# Patient Record
Sex: MALE | URBAN - NONMETROPOLITAN AREA
[De-identification: names, ages, dates, MRNs, and addresses within clinical notes are randomized per-mention and may not be internally consistent; named-entity substitution may affect disease eponyms.]

---

## 2022-09-26 ENCOUNTER — APPOINTMENT (OUTPATIENT)
Dept: URBAN - NONMETROPOLITAN AREA CLINIC 59 | Age: 67
Setting detail: DERMATOLOGY
End: 2022-09-27

## 2022-09-26 DIAGNOSIS — L82.1 OTHER SEBORRHEIC KERATOSIS: ICD-10-CM

## 2022-09-26 DIAGNOSIS — D22 MELANOCYTIC NEVI: ICD-10-CM

## 2022-09-26 DIAGNOSIS — Z85.820 PERSONAL HISTORY OF MALIGNANT MELANOMA OF SKIN: ICD-10-CM

## 2022-09-26 DIAGNOSIS — D18.0 HEMANGIOMA: ICD-10-CM

## 2022-09-26 DIAGNOSIS — L81.4 OTHER MELANIN HYPERPIGMENTATION: ICD-10-CM

## 2022-09-26 PROBLEM — D18.01 HEMANGIOMA OF SKIN AND SUBCUTANEOUS TISSUE: Status: ACTIVE | Noted: 2022-09-26

## 2022-09-26 PROBLEM — D22.5 MELANOCYTIC NEVI OF TRUNK: Status: ACTIVE | Noted: 2022-09-26

## 2022-09-26 PROCEDURE — OTHER MIPS QUALITY: OTHER

## 2022-09-26 PROCEDURE — OTHER COUNSELING: OTHER

## 2022-09-26 PROCEDURE — 99213 OFFICE O/P EST LOW 20 MIN: CPT

## 2022-09-26 PROCEDURE — OTHER ADDITIONAL NOTES: OTHER

## 2022-09-26 ASSESSMENT — LOCATION DETAILED DESCRIPTION DERM
LOCATION DETAILED: INFERIOR THORACIC SPINE
LOCATION DETAILED: MIDDLE STERNUM
LOCATION DETAILED: XIPHOID
LOCATION DETAILED: EPIGASTRIC SKIN
LOCATION DETAILED: LEFT MEDIAL UPPER BACK

## 2022-09-26 ASSESSMENT — LOCATION SIMPLE DESCRIPTION DERM
LOCATION SIMPLE: CHEST
LOCATION SIMPLE: UPPER BACK
LOCATION SIMPLE: ABDOMEN
LOCATION SIMPLE: LEFT UPPER BACK

## 2022-09-26 ASSESSMENT — LOCATION ZONE DERM: LOCATION ZONE: TRUNK

## 2022-09-26 NOTE — HPI: MELANOMA F/U (HISTORY OF MALIGNANT MELANOMA)
What Is The Reason For Today's Visit?: Follow Up Melanoma
Year Excised?: 2018
Additional History: Patient reports that he did have significant weight loss back in December 2021. He lost 50lbs after being sick for 3 weeks. Patient states that prior to losing the weight he was 200lbs and since then has gained 15lbs back.

## 2022-09-26 NOTE — PROCEDURE: ADDITIONAL NOTES
Additional Notes: Discussed with Patient the importance of seeing his PCP as Melanoma can show up anywhere in the body. Recommended that Patient see his PCP for a routine check up.
Detail Level: Simple
Render Risk Assessment In Note?: no

## 2023-09-28 ENCOUNTER — APPOINTMENT (OUTPATIENT)
Dept: URBAN - NONMETROPOLITAN AREA CLINIC 59 | Age: 68
Setting detail: DERMATOLOGY
End: 2023-09-28

## 2023-09-28 DIAGNOSIS — L21.8 OTHER SEBORRHEIC DERMATITIS: ICD-10-CM

## 2023-09-28 DIAGNOSIS — D18.0 HEMANGIOMA: ICD-10-CM

## 2023-09-28 DIAGNOSIS — Z85.820 PERSONAL HISTORY OF MALIGNANT MELANOMA OF SKIN: ICD-10-CM

## 2023-09-28 DIAGNOSIS — Z71.89 OTHER SPECIFIED COUNSELING: ICD-10-CM

## 2023-09-28 DIAGNOSIS — L82.1 OTHER SEBORRHEIC KERATOSIS: ICD-10-CM

## 2023-09-28 DIAGNOSIS — D22 MELANOCYTIC NEVI: ICD-10-CM

## 2023-09-28 DIAGNOSIS — L81.4 OTHER MELANIN HYPERPIGMENTATION: ICD-10-CM

## 2023-09-28 PROBLEM — D22.5 MELANOCYTIC NEVI OF TRUNK: Status: ACTIVE | Noted: 2023-09-28

## 2023-09-28 PROBLEM — D18.01 HEMANGIOMA OF SKIN AND SUBCUTANEOUS TISSUE: Status: ACTIVE | Noted: 2023-09-28

## 2023-09-28 PROCEDURE — OTHER DIAGNOSIS COMMENT: OTHER

## 2023-09-28 PROCEDURE — 99213 OFFICE O/P EST LOW 20 MIN: CPT

## 2023-09-28 PROCEDURE — OTHER COUNSELING: OTHER

## 2023-09-28 PROCEDURE — OTHER MIPS QUALITY: OTHER

## 2023-09-28 PROCEDURE — OTHER SUNSCREEN RECOMMENDATIONS: OTHER

## 2023-09-28 ASSESSMENT — LOCATION DETAILED DESCRIPTION DERM
LOCATION DETAILED: LEFT LATERAL INFERIOR CHEST
LOCATION DETAILED: UPPER STERNUM
LOCATION DETAILED: LEFT MEDIAL SUPERIOR CHEST
LOCATION DETAILED: LEFT SUPERIOR UPPER BACK
LOCATION DETAILED: SUPERIOR THORACIC SPINE
LOCATION DETAILED: RIGHT MEDIAL SUPERIOR CHEST
LOCATION DETAILED: RIGHT LATERAL INFERIOR CHEST

## 2023-09-28 ASSESSMENT — LOCATION ZONE DERM: LOCATION ZONE: TRUNK

## 2023-09-28 ASSESSMENT — LOCATION SIMPLE DESCRIPTION DERM
LOCATION SIMPLE: CHEST
LOCATION SIMPLE: LEFT UPPER BACK
LOCATION SIMPLE: UPPER BACK

## 2023-09-28 NOTE — PROCEDURE: MIPS QUALITY
Quality 137: Melanoma: Continuity Of Care - Recall System: Patient information entered into a recall system that includes: target date for the next exam specified AND a process to follow up with patients regarding missed or unscheduled appointments
Quality 431: Preventive Care And Screening: Unhealthy Alcohol Use - Screening: Patient not identified as an unhealthy alcohol user when screened for unhealthy alcohol use using a systematic screening method
Quality 226: Preventive Care And Screening: Tobacco Use: Screening And Cessation Intervention: Patient screened for tobacco use and is an ex/non-smoker
Quality 111:Pneumonia Vaccination Status For Older Adults: Patient did not receive any pneumococcal conjugate or polysaccharide vaccine on or after their 60th birthday and before the end of the measurement period
Quality 110: Preventive Care And Screening: Influenza Immunization: Influenza Immunization Administered during Influenza season
Quality 138: Melanoma: Coordination Of Care: A treatment plan was communicated to the physicians providing continuing care within one month of diagnosis outlining: diagnosis, tumor thickness and a plan for surgery or alternate care.
Detail Level: Detailed

## 2024-09-30 ENCOUNTER — APPOINTMENT (OUTPATIENT)
Dept: URBAN - NONMETROPOLITAN AREA CLINIC 59 | Age: 69
Setting detail: DERMATOLOGY
End: 2024-09-30

## 2024-09-30 DIAGNOSIS — D22 MELANOCYTIC NEVI: ICD-10-CM

## 2024-09-30 DIAGNOSIS — D18.0 HEMANGIOMA: ICD-10-CM

## 2024-09-30 DIAGNOSIS — Z71.89 OTHER SPECIFIED COUNSELING: ICD-10-CM

## 2024-09-30 DIAGNOSIS — L82.1 OTHER SEBORRHEIC KERATOSIS: ICD-10-CM

## 2024-09-30 DIAGNOSIS — Z85.820 PERSONAL HISTORY OF MALIGNANT MELANOMA OF SKIN: ICD-10-CM

## 2024-09-30 DIAGNOSIS — L81.4 OTHER MELANIN HYPERPIGMENTATION: ICD-10-CM

## 2024-09-30 DIAGNOSIS — L57.0 ACTINIC KERATOSIS: ICD-10-CM

## 2024-09-30 PROBLEM — D18.01 HEMANGIOMA OF SKIN AND SUBCUTANEOUS TISSUE: Status: ACTIVE | Noted: 2024-09-30

## 2024-09-30 PROBLEM — D22.5 MELANOCYTIC NEVI OF TRUNK: Status: ACTIVE | Noted: 2024-09-30

## 2024-09-30 PROCEDURE — OTHER LIQUID NITROGEN: OTHER

## 2024-09-30 PROCEDURE — 99213 OFFICE O/P EST LOW 20 MIN: CPT | Mod: 25

## 2024-09-30 PROCEDURE — OTHER COUNSELING: OTHER

## 2024-09-30 PROCEDURE — 17003 DESTRUCT PREMALG LES 2-14: CPT

## 2024-09-30 PROCEDURE — OTHER MIPS QUALITY: OTHER

## 2024-09-30 PROCEDURE — OTHER SUNSCREEN RECOMMENDATIONS: OTHER

## 2024-09-30 PROCEDURE — 17000 DESTRUCT PREMALG LESION: CPT

## 2024-09-30 ASSESSMENT — LOCATION SIMPLE DESCRIPTION DERM
LOCATION SIMPLE: UPPER BACK
LOCATION SIMPLE: CHEST
LOCATION SIMPLE: RIGHT TEMPLE
LOCATION SIMPLE: RIGHT ZYGOMA

## 2024-09-30 ASSESSMENT — LOCATION ZONE DERM
LOCATION ZONE: FACE
LOCATION ZONE: TRUNK

## 2024-09-30 ASSESSMENT — LOCATION DETAILED DESCRIPTION DERM
LOCATION DETAILED: RIGHT MEDIAL SUPERIOR CHEST
LOCATION DETAILED: RIGHT CENTRAL TEMPLE
LOCATION DETAILED: LEFT MEDIAL SUPERIOR CHEST
LOCATION DETAILED: UPPER STERNUM
LOCATION DETAILED: SUPERIOR THORACIC SPINE
LOCATION DETAILED: RIGHT LATERAL ZYGOMA

## 2024-09-30 NOTE — PROCEDURE: LIQUID NITROGEN
Show Applicator Variable?: Yes
Number Of Freeze-Thaw Cycles: 3 freeze-thaw cycles
Render Post-Care Instructions In Note?: no
Detail Level: Detailed
Consent: The patient's consent was obtained including but not limited to risks of crusting, scabbing, blistering, scarring, darker or lighter pigmentary change, recurrence, incomplete removal and infection.
Duration Of Freeze Thaw-Cycle (Seconds): 5
Application Tool (Optional): Liquid Nitrogen Sprayer
Post-Care Instructions: I reviewed with the patient in detail post-care instructions. Patient is to wear sunprotection, and avoid picking at any of the treated lesions. Pt may apply Vaseline to crusted or scabbing areas.